# Patient Record
Sex: MALE | Employment: OTHER | URBAN - METROPOLITAN AREA
[De-identification: names, ages, dates, MRNs, and addresses within clinical notes are randomized per-mention and may not be internally consistent; named-entity substitution may affect disease eponyms.]

---

## 2017-01-17 PROBLEM — M79.671 RIGHT FOOT PAIN: Status: ACTIVE | Noted: 2017-01-17

## 2017-01-17 PROBLEM — N40.1 BPH ASSOCIATED WITH NOCTURIA: Status: ACTIVE | Noted: 2017-01-17

## 2017-01-17 PROBLEM — R35.1 BPH ASSOCIATED WITH NOCTURIA: Status: ACTIVE | Noted: 2017-01-17

## 2017-03-03 ENCOUNTER — TELEPHONE (OUTPATIENT)
Dept: NEUROLOGY | Facility: CLINIC | Age: 72
End: 2017-03-03

## 2017-03-03 RX ORDER — MIRTAZAPINE 15 MG/1
7.5 TABLET, FILM COATED ORAL NIGHTLY
Qty: 45 TABLET | Refills: 3 | Status: SHIPPED | OUTPATIENT
Start: 2017-03-03 | End: 2017-10-31

## 2017-03-03 RX ORDER — MIRTAZAPINE 30 MG/1
15 TABLET, FILM COATED ORAL NIGHTLY
Qty: 45 TABLET | Refills: 3 | Status: SHIPPED | OUTPATIENT
Start: 2017-03-03 | End: 2017-03-03

## 2017-03-03 NOTE — TELEPHONE ENCOUNTER
Patient states he currently takes Mirtazapine 7.5 mg daily. States 90 day supply of 7.5 mg costs him $171, but wonders if Dr. Obed Reynolds can prescribe 15 mg instead and he can split the tablet in half.   States 90 day supply of 15 mg costs only $10.  He woul

## 2017-03-03 NOTE — TELEPHONE ENCOUNTER
Routing History To From    3/3/2017 3:04 PM KRYSTLE VELASCO NURSE Cecily Prasad MD    Comment:  Changed RX to 15mg tab. Patient notified.

## 2017-10-31 DIAGNOSIS — I34.0 NON-RHEUMATIC MITRAL REGURGITATION: ICD-10-CM

## 2017-10-31 DIAGNOSIS — I25.10 CORONARY ARTERY DISEASE INVOLVING NATIVE CORONARY ARTERY OF NATIVE HEART WITHOUT ANGINA PECTORIS: ICD-10-CM

## 2017-10-31 DIAGNOSIS — E78.00 PURE HYPERCHOLESTEROLEMIA: ICD-10-CM

## 2017-10-31 DIAGNOSIS — Q87.40 MARFAN SYNDROME: ICD-10-CM

## 2017-11-01 RX ORDER — RIVASTIGMINE TARTRATE 4.5 MG/1
CAPSULE ORAL
Qty: 180 CAPSULE | Refills: 0 | Status: SHIPPED | OUTPATIENT
Start: 2017-11-01 | End: 2018-03-07

## 2017-11-01 RX ORDER — MIRTAZAPINE 15 MG/1
7.5 TABLET, FILM COATED ORAL NIGHTLY
Qty: 45 TABLET | Refills: 0 | Status: SHIPPED | OUTPATIENT
Start: 2017-11-01 | End: 2018-01-30

## 2017-11-01 NOTE — TELEPHONE ENCOUNTER
Refill request for exelon 4.5 mg, BID, #180, no refills  Refill request for mirtazapine 15 mg, take 1/2 tab kiley, #45, no refills    LOV: 12/14/16  NOV: None

## 2018-02-13 ENCOUNTER — TELEPHONE (OUTPATIENT)
Dept: NEUROLOGY | Facility: CLINIC | Age: 73
End: 2018-02-13

## 2018-02-13 DIAGNOSIS — I34.0 NON-RHEUMATIC MITRAL REGURGITATION: ICD-10-CM

## 2018-02-13 DIAGNOSIS — I25.10 CORONARY ARTERY DISEASE INVOLVING NATIVE CORONARY ARTERY OF NATIVE HEART WITHOUT ANGINA PECTORIS: ICD-10-CM

## 2018-02-13 DIAGNOSIS — Q87.40 MARFAN SYNDROME: ICD-10-CM

## 2018-02-13 DIAGNOSIS — E78.00 PURE HYPERCHOLESTEROLEMIA: ICD-10-CM

## 2018-02-13 RX ORDER — RIVASTIGMINE TARTRATE 4.5 MG/1
CAPSULE ORAL
Qty: 180 CAPSULE | Refills: 0 | OUTPATIENT
Start: 2018-02-13

## 2018-03-02 RX ORDER — MIRTAZAPINE 7.5 MG/1
7.5 TABLET, FILM COATED ORAL NIGHTLY
Qty: 30 TABLET | Refills: 3 | Status: SHIPPED | OUTPATIENT
Start: 2018-03-02 | End: 2018-05-12

## 2018-03-02 NOTE — TELEPHONE ENCOUNTER
Refill request for mirtazapine 7.5 mg, take 1 tab nightly, #30, no refills    LOV: 12/4/16  NOV: 3/6/18

## 2018-03-07 NOTE — PROGRESS NOTES
Molly Cole : 1945     HPI:   Patient presents with:  Memory Loss: AEP:15/11/0500 Patient is here for a f/u. Pt c/o of some trouble with memory and occ. trouble with balance. Pt takes Rivastigmine 4.5 mg bid.         Ed Colon was seen for neur under the tongue daily. Disp:  Rfl:    Desoximetasone 0.05 % External Gel Apply topically to aa BID PRN. Disp: 15 g Rfl: 2   aspirin 325 MG Oral Tab Take 325 mg by mouth daily. Disp: 90 tablet Rfl: 1   VITAMIN C  mg daily.   Disp:  Rfl:    CENTRUM NASEEM CVA [OTHER] Mother    • Heart Disorder Father      arrhythmia   • Heart Disorder Maternal Uncle       Social History:  Social History    Marital status:              Spouse name:                       Years of education:                 Number of ch Fundoscopic exam normal.  III,IV,VI- EOM full, with normal pursuit. V-Facial sensation intact, with symmetric corneal reflex. VII- face symmetric. Motor: 5/5 strength in the upper and lower extremities.   Tone normal. No pronator drift    Coordination: F

## 2018-05-14 RX ORDER — MIRTAZAPINE 7.5 MG/1
7.5 TABLET, FILM COATED ORAL NIGHTLY
Qty: 30 TABLET | Refills: 3 | Status: SHIPPED | OUTPATIENT
Start: 2018-05-14 | End: 2018-07-09

## 2018-07-07 PROCEDURE — 82607 VITAMIN B-12: CPT | Performed by: OTHER

## 2019-01-17 PROBLEM — R41.89 COGNITIVE CHANGE: Status: ACTIVE | Noted: 2019-01-17

## 2019-02-05 PROBLEM — R49.9 CHANGE IN VOICE: Status: ACTIVE | Noted: 2019-02-05

## 2019-02-05 PROBLEM — R42 DIZZINESS: Status: ACTIVE | Noted: 2019-02-05

## 2019-04-30 PROCEDURE — 88305 TISSUE EXAM BY PATHOLOGIST: CPT | Performed by: OTOLARYNGOLOGY

## 2019-08-19 PROBLEM — R42 DIZZINESS: Status: RESOLVED | Noted: 2019-02-05 | Resolved: 2019-08-19

## 2019-08-19 PROBLEM — M79.671 RIGHT FOOT PAIN: Status: RESOLVED | Noted: 2017-01-17 | Resolved: 2019-08-19

## 2019-08-20 PROCEDURE — 83655 ASSAY OF LEAD: CPT | Performed by: INTERNAL MEDICINE

## 2021-02-11 DIAGNOSIS — Z23 NEED FOR VACCINATION: ICD-10-CM

## 2021-02-16 ENCOUNTER — IMMUNIZATION (OUTPATIENT)
Dept: LAB | Age: 76
End: 2021-02-16
Attending: HOSPITALIST
Payer: MEDICARE

## 2021-02-16 DIAGNOSIS — Z23 NEED FOR VACCINATION: Primary | ICD-10-CM

## 2021-02-16 PROCEDURE — 0001A SARSCOV2 VAC 30MCG/0.3ML IM: CPT

## 2021-03-09 ENCOUNTER — IMMUNIZATION (OUTPATIENT)
Dept: LAB | Age: 76
End: 2021-03-09
Attending: HOSPITALIST
Payer: MEDICARE

## 2021-03-09 DIAGNOSIS — Z23 NEED FOR VACCINATION: Primary | ICD-10-CM

## 2021-03-09 PROCEDURE — 0002A SARSCOV2 VAC 30MCG/0.3ML IM: CPT

## 2021-10-05 PROBLEM — D69.6 PLATELETS DECREASED (HCC): Status: ACTIVE | Noted: 2021-10-05

## 2021-10-05 PROBLEM — Z86.010 HISTORY OF ADENOMATOUS POLYP OF COLON: Status: ACTIVE | Noted: 2021-10-05

## 2021-10-05 PROBLEM — E78.49 OTHER HYPERLIPIDEMIA: Status: ACTIVE | Noted: 2021-10-05

## 2022-02-03 PROBLEM — I68.0 CEREBRAL AMYLOID ANGIOPATHY (HCC): Status: ACTIVE | Noted: 2022-02-03

## 2022-02-03 PROBLEM — H91.13 PRESBYCUSIS OF BOTH EARS: Status: ACTIVE | Noted: 2022-02-03

## 2022-02-03 PROBLEM — E85.4 CEREBRAL AMYLOID ANGIOPATHY: Status: ACTIVE | Noted: 2022-02-03

## 2022-02-03 PROBLEM — E85.4 CEREBRAL AMYLOID ANGIOPATHY (HCC): Status: ACTIVE | Noted: 2022-02-03

## 2022-02-03 PROBLEM — I68.0 CEREBRAL AMYLOID ANGIOPATHY: Status: ACTIVE | Noted: 2022-02-03

## 2022-02-10 PROBLEM — R26.89 BALANCE DISORDER: Status: ACTIVE | Noted: 2022-02-10

## (undated) NOTE — LETTER
[address], [address], [address]  [place]   [phone]        Dear Keith Paige MD,      I had the pleasure of seeing your patient, Jun Samaniego on 3/7/2018.      Below please MIRTAZAPINE 7.5 MG Oral Tab TAKE 1 TABLET (7.5 MG TOTAL) BY MOUTH NIGHTLY.  Disp: 30 tablet Rfl: 3   CLOPIDOGREL BISULFATE 75 MG Oral Tab TAKE 1 TABLET BY MOUTH EVERY DAY Disp: 90 tablet Rfl: 1   LISINOPRIL 5 MG Oral Tab TAKE 1 TABLET BY MOUTH EVERY DAY Dis Comment: Performed by Raj Baker at [address]  10/17/07: LAPAROSCOPIC REPAIR OF INITIAL      Comment: Performed by Raj Baker at [address]: OTHER SURGICAL HISTORY      Comment: R General appearance: In no distress  CV: No  Evidence of Carotid Bruits, Regular Rate and Rhythm. Neuro:  Higher Integrative Functions:  Alert and cooperative, with normal attention span and concentration.   Speech and language normal.  Oriented times thre